# Patient Record
Sex: MALE | Race: WHITE
[De-identification: names, ages, dates, MRNs, and addresses within clinical notes are randomized per-mention and may not be internally consistent; named-entity substitution may affect disease eponyms.]

---

## 2018-04-28 ENCOUNTER — HOSPITAL ENCOUNTER (OUTPATIENT)
Dept: HOSPITAL 45 - C.NEUR | Age: 68
Discharge: HOME | End: 2018-04-28
Attending: FAMILY MEDICINE
Payer: OTHER GOVERNMENT

## 2018-04-28 DIAGNOSIS — G47.33: Primary | ICD-10-CM

## 2018-04-29 NOTE — PAP/PSG TECHNICIAN REPORT
[facility]

Technician Polysomnogram Report



Study name:

None

Report date:

4/29/2018



Study date:

4/28/2018

Referring Physician:

DR. TENZIN CHAIREZ



Name:

CAMERON CHILDS 

Interpreting Physician:

Hemanth Chaparro M.D.



YOB: 1950

Technician:

Ravi Arce RPSGT.



Sex:

Male







Age:

67

StudyType:

PSG



Weight:











Height:

67 years, Height 









BMI:









Medications:

LISINOPRIL 20 MG, METFORMIN HCL 1000 MG, RANITIDINE  MG, SAXAGLIPTIN HCL 5 MG, SIMVASTATIN 
80 MG, TAMSULOSIN HCL 0.4 MG















Patient History





PATIENT HAS HISTORY OF SNORING AND DAYTIME FATIGUE. HE IS HERE TODAY BECAUSE HE IS PREPARING FOR 
KNEE SURGERY. ESS = 5 RM 6







Parameters Monitored

NPSG: E1-M2, E2-M1, Fp1-M2, Fp2-M1, F3-M2, F4-M2, F4-M1, C3-M2, C4-M2, C4-M1, O1-M2, O2-M2,

O2-M1, T3-M2, T4-M1, P3-M2, P4-M1, CHIN1, CHIN2, HR, EKG, Legs, PFLOW, SNOR, FLOW, CFLOW,

Tidal Volume, THOR, ABDO, SpO2, PLTH, CPRESS, ETCO2 Wave, ETCO2, pH





Sleep Architecture



Sleep Stages



Time at Lights Off

10:15:40 PM



STAGES

Time (min.)

TST (%)



Time at Lights On

4:54:40 AM



Wake

99.0

--



Total Recording Time (TRT)

399.50 min.



N1

29.5

10



Total Sleep Period (TSP)

352.0 min.



N2

199.0

66



Total Sleep Time (TST)

300.0min.



N3

0.0

0



Awake Time

99.5 min.



REM

71.5

24



Wake after Sleep Onset

60.5 min.











Sleep Efficiency (SE)

75 %











Sleep Onset Latency (SOL)

38.5 min.











Number of Stage 1 Shifts

None











Awakenings

15











Stage Changes

55











Number of REM periods

2



REM

71.5

24



REM Latency

187.0 min.



NREM

228.5

76





Body Position Analysis





Supine

Right

Left

Side

Prone

Vertical



Total Sleep Time (min.)

152.9

105.0

105.7

210.70

0.0

0.0



Total Sleep Time (%)

30%

35%

35%

70

0%

N/A%



Total Sleep Time REM (min.)

30.0

41.5

0.0

None

0.0

0.0



Total Sleep Time NREM (min.)

59.3

63.5

105.7

None

0.0

0.0



Intermittent Wake (min.)

63.6

11.7

23.7

None

0.0

0.0



Total Sleep Period (%)

33%

None





Arousals







Myoclonus (PLM) *







Events

Count

Index



Events

Count

Index



Spontaneous

18

4



Events Awake (PLMW)

106

64.2



Respiratory

36

7.2



Events Asleep w/ Arousal (PLMA)

16

3.2



PLM

14

3



Events Asleep w/o Arousal (PLMS)

114

22.8



Snoring

23

5



Total Asleep

130

26.0



Total

88

18



Total

236

35







Respiratory Analysis *





CA

OA

MA

CH

H

RERA

Total



Count

0

32

0

0

117

0

149



Index

0.0

6.4

0.0

0

23.4

0

29.8



Mean Duration

0.0

25.2

0.0

0.00

22.6

0.0

23.2



Longest Duration

0.0

50.5

0.0

0.00

0.0

0.0

50.5







Respiratory Event Summary 







Total

Supine

~Supine

Right

Left

Prone

REM

NREM



Apneas

Count

32

28

4

2

2

N/A

12

20





Index

6.4

19

1

1.1

1.1

N/A

10

5



Hypopneas (4% Desat)

Count

117

76

41

24

17

N/A

51

66





Index

23.4

51.1

12

13.7

9.7

N/A

42.8

17.3



Apneas & All Hypopneas 

Count

149

104

45

26

19

N/A

63

86





Index

29.8

70

13

15

11

N/A

52.9

22.6



Respiratory Events 

(Apn+All Hyp+RERA)

Count

149

104

45

26

19

N/A

63

86





Index

29.8

70

13

14.9

10.8

N/A

52.9

22.6



Respiratory Related Arousal

Count

36

104

5

0

5

N/A

12

24





Index

7.2

21

1

0

3

N/A

10

6





Snoring Analysis





Supine

Right

Left

Prone

REM

NREM

Total



Snore duration

66.0 min



Snores count

577

1,340

725

N/A

778

1,864

2,642



Snore mean duration

1.5 Sec



Snores index

388

766

412

N/A

652.9

489.5

528.4



TST with snoring (%)

22.0%







Desaturation Event Summary:



Minimum %SpO2

Event Count

Mean/Min/Max Duration(sec.)

Desaturation Index

% Time In Bed



> 90

153

26.9 / 5.8 / 60.0

27.1

87.8



86 - 90

21

25.0 / 4.5 / 51.5

31.4

10.4



81 - 85

2

6.4 / 5.8 / 7.0

23.5

1.3



76 - 80

1

7.0 / 7.0 / 7.0

43.6

0.4



71 - 75

0

N/A

0.0

0.1



66 - 70

0

N/A

0.0

0.0



61 - 65

0

N/A

0.0

0.0



56 - 60

0

N/A

0.0

0.0



51 - 55

0

N/A

0.0

0.0



< 50

0

N/A

0.0

0.0









Total

REM

NREM

Awake



<50%

0.0 min.



51 - 60%

0.0 min.



61 - 70%

0.0 min.



71 - 80%

1.9 min.

1.6 min.

0.2 min.

0.1 min.



81 - 90%

45.2 min.

30.5 min.

12.2 min.

2.4 min.



91 - 100%

339.2 min.

39.4 min.

213.4 min.

86.4 min.



Average

93

91

93

94



Minimum SpO2

71

71

75

71



Desaturation Event Index

24.7

52.9

23.6

7.9



# Desat. Events below 89%

62

50

7

5



Time(%) with Saturation below 89%

5.6

4.9

0.5

0.2



Time(min.) with Saturation below 89%

21.7

18.8

2.0

0.9









Time (mins)

REM (mins)

NREM (mins)

% of TST



SpO2 Below 90%

88

54

N34

9.6



SpO2 Below 88%

30

0

0

5





Heart Rate Analysis









Min (bpm)

Max (bpm)

Average (bpm)



Awake

38

214

84



NREM

65

102

85



REM

67

103

86



Overall

65

103

85













Supplemental O2 Values



Minimum O2 level: None



Value  

Start Time

End Time





Technician Comments





Mr. Childs slept in the right, left and supine positions.  PAC's noted. Leg movements noted.  No 
bruxism noted.  Snoring was noted and scored as a 4 on a scale of 1 through 5. (0=no snoring, 
5=snoring loud enough to be heard through a closed door or down the [address] way)  Mr. Childs awoke to 
use the restroom 0 times during the night.  Mr. Childs stated I slept as well as I do when I am in my 
own bed. 



The final report will be interpreted and signed by a sleep physician. The completed physician report 
will then be placed in the patient medical record.







 



 



 



 



 



 



 



 

 



Therapy (cm H2O)

0



TIB (min.)

399.0



TST (min.)

300.0



Sleep Onset (min.)

38.5



REM Onset From Sleep (min.)

187.0



Sleep Efficiency %

75



Wakefulness (%)

25



Wakefulness (min.)

99.5



NREM 1 (%)

10



NREM 1 (min.)

29.5



NREM 2 (%)

66



NREM 2 (min.)

199.0



NREM 3 (%)

0



NREM 3 (min.)

0.0







REM (%)

24



REM (min.)

71.5



# Arousals

88



Arousal Index

18



# Snore

2,642



Snore Index

528.4



AHI

29.8



AHI Supine

70



AHI Non-Supine

13



NREM AHI

22.6



REM AHI

52.9



RDI

29.8



# Obstructive Apnea

32



# Central Apnea

0



# Mixed Apnea

0







# Hypopneas

117



RERAs

0



Total Respiratory Events

152



Time Below SpO2 89% (min.)

20.8



Mean NREM SpO2 (%)

93



Mean REM SpO2 (%)

91



Mean Sleep SpO2 (%)

92



Min NREM SpO2 (%)

75



Min REM SpO2 (%)

71



Position Supine (min.)

152.9



Position Non-supine (min.)

210.7



LM Index Sleep

26.0



LM Index NREM

29.7



LM Index REM

14.3







Mean Heart Rate (bpm)

85



Min Heart Rate (bpm)

65

## 2018-05-01 NOTE — POLYSOMNOGRAPH REPORT
CLINICAL DATA:  A 67-year-old male referred by Dr. Joshua Culp with a history

of snoring and fatigue.  He is being evaluated prior to upcoming knee surgery. 

His [place] sleepiness score is 5/24.

 

SLEEP ARCHITECTURE:  Total sleep period was 352 minutes.  Total sleep time

was 300 minutes divided between 228.5 minutes of non-REM sleep and 71.5

minutes of REM sleep.  Sleep latency was delayed at 38.5 minutes.  REM

latency was delayed at 187 minutes.  Sleep efficiency was 75%.  Wake after

sleep onset was elevated at 60.5 minutes.  Sleep consisted of stage N1 

10%, stage N2 66%, and REM 24%.

 

AROUSAL DATA:  88 arousals were recorded for an index of 18 per hour.

 

PLM DATA:  130 limb movements during sleep were noted for an index of 26 per

hour with arousal index of 3.2 per hour.

 

RESPIRATORY DATA:  Severe sleep apnea was documented.  The AHI was 30.  There

were 32 obstructive apneic episodes.  The longest duration of apnea was 50.5

seconds.  There were 117 hypopneic episodes.  The mean duration of hypopnea

was 22.6 seconds.

 

OXIMETRY DATA:  Nocturnal hypoxemia was seen.  Oxygen leilani was 71% during

REM.  Mean saturation was 93%.  Time below 88% was 30 minutes.

 

EKG:  Heart rates ranged from  beats per minute.  PACs were noted.

 

TECHNICIAN'S COMMENTS:  The patient slept in the right, left, and supine

positions.  Snoring was severe, rated 4 on a scale of 1-5.

 

IMPRESSION:  Severe sleep apnea/hypopnea with an AHI of 30 with nocturnal

hypoxemia.

 

RECOMMENDATIONS:  The patient may benefit from a repeat sleep study with CPAP

and/or sleep medicine consultation.

 

 

[facility]